# Patient Record
Sex: MALE | Race: WHITE | NOT HISPANIC OR LATINO
[De-identification: names, ages, dates, MRNs, and addresses within clinical notes are randomized per-mention and may not be internally consistent; named-entity substitution may affect disease eponyms.]

---

## 2020-01-17 ENCOUNTER — APPOINTMENT (OUTPATIENT)
Dept: SURGERY | Facility: CLINIC | Age: 65
End: 2020-01-17
Payer: MEDICARE

## 2020-01-17 VITALS
HEIGHT: 70 IN | WEIGHT: 173.06 LBS | OXYGEN SATURATION: 97 % | HEART RATE: 71 BPM | SYSTOLIC BLOOD PRESSURE: 124 MMHG | BODY MASS INDEX: 24.78 KG/M2 | DIASTOLIC BLOOD PRESSURE: 87 MMHG

## 2020-01-17 DIAGNOSIS — Z87.19 OTHER SPECIFIED POSTPROCEDURAL STATES: ICD-10-CM

## 2020-01-17 DIAGNOSIS — Z98.890 OTHER SPECIFIED POSTPROCEDURAL STATES: ICD-10-CM

## 2020-01-17 DIAGNOSIS — R19.8 OTHER SPECIFIED SYMPTOMS AND SIGNS INVOLVING THE DIGESTIVE SYSTEM AND ABDOMEN: ICD-10-CM

## 2020-01-17 DIAGNOSIS — R10.9 UNSPECIFIED ABDOMINAL PAIN: ICD-10-CM

## 2020-01-17 PROCEDURE — 99215 OFFICE O/P EST HI 40 MIN: CPT

## 2020-01-21 PROBLEM — Z98.890 S/P RIGHT INGUINAL HERNIA REPAIR: Status: RESOLVED | Noted: 2020-01-21 | Resolved: 2020-01-21

## 2020-01-21 PROBLEM — Z98.890 S/P RIGHT INGUINAL HERNIA REPAIR: Status: ACTIVE | Noted: 2020-01-21

## 2020-01-21 PROBLEM — Z98.890 S/P REPAIR OF VENTRAL HERNIA: Status: ACTIVE | Noted: 2020-01-21

## 2020-01-21 PROBLEM — R19.8 ASYMMETRY OF ABDOMINAL WALL: Status: ACTIVE | Noted: 2020-01-21

## 2020-01-21 NOTE — PLAN
[FreeTextEntry1] : Surgically stable s/p open repair of ventral hernia with mesh in 2014- no indication by history or examination of recurrence or complication.  Surgically stable s/p open repair of right inguinal hernia with mesh in 2015- no indication by history or examination of recurrence or complication.\par \par Patient counseled that weight loss and core exercise may be useful in returning to the scaphoid abdomen he desires.  At this time, I have told him that there is indeed  difference in comparing the "flush" groin of the right (s/p repair with mesh) with the slight "fullness" of the left, but no rylan hernia at time of this examination.\par \par However, while he remains anxious regarding to any further procedures at this time given his anxiety and complete lack of symptoms, I have told him that ongoing observation seems reasonable given the focal laxity and the need for prior hernia repairs.  \par \par He is agreeable to this at present.  I have encouraged him to call with questions, concerns, changes or new issue.  Otherwise, a return to office in 3 months is planned.  Should a hernia indeed become manifest than further decisions can follow regarding elective repair.\par \par He is pleased, agrees, leaves in good spirits and is able to verbalize the above.  In the interim, further review of his prior outside records can follow.\par

## 2020-01-21 NOTE — PHYSICAL EXAM
[Respiratory Effort] : normal respiratory effort [Normal Rate and Rhythm] : normal rate and rhythm [No HSM] : no hepatosplenomegaly [Tender] : was nontender [Enlarged] : not enlarged [No Rash or Lesion] : No rash or lesion [Alert] : alert [Oriented to Place] : oriented to place [Oriented to Person] : oriented to person [Oriented to Time] : oriented to time [Anxious] : anxious [de-identified] : Appears well, no acute distress, ambulates easily into office and assumes examination table without need of assistance. [de-identified] : Normocephalic and atraumatic. [FreeTextEntry1] : No cervical, supraclavicular, axillary or inguinal adenopathy. [de-identified] : Normal male. [de-identified] : Supple with full range of motion. [de-identified] : Abdomen slightly full or mildly obese, but soft and non tense.\par Epigastrium free of masses or defects.\par Periumbilical region with well-healed incision consistent with known history.  No SQ collections.  No fascial defects on cough and Valsalva maneuver in both upright and supine positions.\par Right groin similarly with well-healed incision consistent with known history.  No SQ collections.  No fascial defects on cough and Valsalva maneuver in both upright and supine positions.  Left groin with SLIGHT asymmetry or diffuse fullness on left compared to right side.  During cough and maximal Valsalva maneuvers there is a generalized laxity of the inguinal space but no rylan hernia or fascial defect appreciated.  The adjacent femoral and Spigelian spaces remain completely WNL.  [de-identified] : Deferred. [de-identified] : Normal penis; testes free of masses and positioned within the scrotum. [de-identified] : Grossly symmetric and within normal limits without any obvious motor or sensory deficits. [de-identified] : VERY anxious, admittedly so, though not inappropriately...

## 2020-01-21 NOTE — REVIEW OF SYSTEMS
[Feeling Poorly] : not feeling poorly [Feeling Tired] : not feeling tired [As Noted in HPI] : as noted in HPI [Constipation] : constipation [Anxiety] : anxiety [Depression] : no depression [Negative] : Heme/Lymph [de-identified] : regarding this visit and issue... [FreeTextEntry7] : as baseline chronic complaint or low grade underlying issue...

## 2020-01-21 NOTE — HISTORY OF PRESENT ILLNESS
[de-identified] : Patient known to me from prior distant care with open repair of RIH in 2015.\par Also, previously treated by and with a former partner for open repair of ventral hernia in 2014.\par Fortunately, he denies any discomfort or pain at present.\par However, he is admittedly very anxious.  He would like those sites "checked."\par In addition, he reports disliking the overall contour of his abdominal wall as well as an asymmetry between the right and left groins.

## 2020-05-21 ENCOUNTER — APPOINTMENT (OUTPATIENT)
Dept: GASTROENTEROLOGY | Facility: CLINIC | Age: 65
End: 2020-05-21

## 2020-05-21 ENCOUNTER — APPOINTMENT (OUTPATIENT)
Dept: GASTROENTEROLOGY | Facility: CLINIC | Age: 65
End: 2020-05-21
Payer: COMMERCIAL

## 2020-05-21 DIAGNOSIS — Z84.89 FAMILY HISTORY OF OTHER SPECIFIED CONDITIONS: ICD-10-CM

## 2020-05-21 PROCEDURE — 99214 OFFICE O/P EST MOD 30 MIN: CPT | Mod: 95

## 2020-05-21 NOTE — ASSESSMENT
[FreeTextEntry1] : My impression is that of diverticulosis with occasional constipation and bloating, much better.\par \par I have once again emphasized the use of osmotic laxatives and fiber supplementation. \par \par WIll mail fecal DNA test and proceed after that.

## 2020-05-21 NOTE — HISTORY OF PRESENT ILLNESS
[Home] : at home, [unfilled] , at the time of the visit. [Other Location: e.g. Home (Enter Location, City,State)___] : at [unfilled] [Verbal consent obtained from patient] : the patient, [unfilled] [de-identified] : The patient reports no longer right flank pain since passing kidney stone. He does not recall that eating preceded this. \par \par Lost weight with exercise and better diet.  Had umbilical and inguinal hernia repairs.   No heartburn, odyno/dysph or satiety.  Only if he eats bread quickly he has "agita"\par \par The patient his bowels on a regular basis with an increased fiber. He denies bright red blood per rectum.  He feels gassy and bloated, on occasion.  He's concerned about diverticulosis.

## 2020-05-21 NOTE — PHYSICAL EXAM
[General Appearance - Alert] : alert [General Appearance - In No Acute Distress] : in no acute distress [General Appearance - Well Nourished] : well nourished [General Appearance - Well Developed] : well developed [Sclera] : the sclera and conjunctiva were normal [Outer Ear] : the ears and nose were normal in appearance [Neck Appearance] : the appearance of the neck was normal [] : no respiratory distress [Skin Color & Pigmentation] : normal skin color and pigmentation [Oriented To Time, Place, And Person] : oriented to person, place, and time [Impaired Insight] : insight and judgment were intact [Affect] : the affect was normal [Mood] : the mood was normal

## 2022-11-07 ENCOUNTER — NON-APPOINTMENT (OUTPATIENT)
Age: 67
End: 2022-11-07